# Patient Record
Sex: MALE | Race: ASIAN | NOT HISPANIC OR LATINO | URBAN - METROPOLITAN AREA
[De-identification: names, ages, dates, MRNs, and addresses within clinical notes are randomized per-mention and may not be internally consistent; named-entity substitution may affect disease eponyms.]

---

## 2017-02-03 ENCOUNTER — EMERGENCY (EMERGENCY)
Facility: HOSPITAL | Age: 42
LOS: 1 days | Discharge: HOME | End: 2017-02-03
Admitting: EMERGENCY MEDICINE
Payer: MEDICAID

## 2017-02-03 VITALS
TEMPERATURE: 99 F | DIASTOLIC BLOOD PRESSURE: 85 MMHG | OXYGEN SATURATION: 98 % | RESPIRATION RATE: 18 BRPM | SYSTOLIC BLOOD PRESSURE: 127 MMHG | HEART RATE: 119 BPM

## 2017-02-03 DIAGNOSIS — R69 ILLNESS, UNSPECIFIED: ICD-10-CM

## 2017-02-03 DIAGNOSIS — F31.60 BIPOLAR DISORDER, CURRENT EPISODE MIXED, UNSPECIFIED: ICD-10-CM

## 2017-02-03 PROCEDURE — 99284 EMERGENCY DEPT VISIT MOD MDM: CPT | Mod: 25

## 2017-02-03 PROCEDURE — 90792 PSYCH DIAG EVAL W/MED SRVCS: CPT

## 2017-02-03 NOTE — ED BEHAVIORAL HEALTH ASSESSMENT NOTE - DESCRIPTION
Sitting quietly in patient waiting area, pending final disposition CVA three years ago Unemployed, domiciled with family in apartment in [place]
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
## 2017-02-03 NOTE — ED BEHAVIORAL HEALTH ASSESSMENT NOTE - RISK ASSESSMENT
Risk factors: Non compliance with treatment, substance use, mood episode  protective factors: No history of violence or aggression, supportive family, no access to firearms

## 2017-02-03 NOTE — ED PROVIDER NOTE - MEDICAL DECISION MAKING DETAILS
pt medically cleared at this time.  No signs of jaw complaints or need for imaging.  Will dispo per  team

## 2017-02-03 NOTE — ED BEHAVIORAL HEALTH ASSESSMENT NOTE - SUICIDE PROTECTIVE FACTORS
Future oriented/Supportive social network or family/Identifies reasons for living/Responsibility to family and others
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
## 2017-02-03 NOTE — ED BEHAVIORAL HEALTH NOTE - BEHAVIORAL HEALTH NOTE
Regarding assisting patient to begin outpatient treatment:     Patient was entered in the urgent referral binder. Writer left him a voicemail message at [phone]. Documentation states patient uses marijuana regularly, so he will be offered [facility] clinic referrals.
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
## 2017-02-04 NOTE — ED BEHAVIORAL HEALTH NOTE - BEHAVIORAL HEALTH NOTE
Writer left patient another message, offering assistance obtaining outpatient treatment, leaving the social work [employer] link number on his voicemail.

## 2017-02-07 NOTE — ED BEHAVIORAL HEALTH NOTE - BEHAVIORAL HEALTH NOTE
made a  outreach call to patient at  however phone number does not take messages. Writer called [phone] and spoke to pt's mother who took down social work [employer] Phone number and stated she would give patient the message to call back.

## 2017-02-08 NOTE — ED BEHAVIORAL HEALTH NOTE - BEHAVIORAL HEALTH NOTE
Writer made a  outreach to patient , however that number does not accept voicemails. Writer called  and left a voicemail requesting a callback to [employer] work [facility].
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
